# Patient Record
Sex: FEMALE | Race: WHITE | NOT HISPANIC OR LATINO | URBAN - METROPOLITAN AREA
[De-identification: names, ages, dates, MRNs, and addresses within clinical notes are randomized per-mention and may not be internally consistent; named-entity substitution may affect disease eponyms.]

---

## 2021-01-25 ENCOUNTER — OFFICE VISIT (OUTPATIENT)
Dept: URBAN - METROPOLITAN AREA CLINIC 46 | Facility: CLINIC | Age: 71
End: 2021-01-25

## 2021-03-22 ENCOUNTER — OFFICE VISIT (OUTPATIENT)
Dept: URBAN - METROPOLITAN AREA CLINIC 46 | Facility: CLINIC | Age: 71
End: 2021-03-22

## 2021-05-04 ENCOUNTER — OFFICE VISIT (OUTPATIENT)
Dept: URBAN - METROPOLITAN AREA CLINIC 46 | Facility: CLINIC | Age: 71
End: 2021-05-04

## 2021-06-09 ENCOUNTER — OFFICE VISIT (OUTPATIENT)
Dept: URBAN - NONMETROPOLITAN AREA CLINIC 9 | Facility: CLINIC | Age: 71
End: 2021-06-09

## 2021-08-28 ENCOUNTER — TELEPHONE ENCOUNTER (OUTPATIENT)
Dept: URBAN - METROPOLITAN AREA CLINIC 13 | Facility: CLINIC | Age: 71
End: 2021-08-28

## 2021-08-29 ENCOUNTER — TELEPHONE ENCOUNTER (OUTPATIENT)
Dept: URBAN - METROPOLITAN AREA CLINIC 13 | Facility: CLINIC | Age: 71
End: 2021-08-29

## 2021-08-29 RX ORDER — IBUPROFEN 100 MG/1
TABLET, COATED ORAL
Status: ACTIVE | COMMUNITY

## 2021-08-29 RX ORDER — FLUOXETINE HYDROCHLORIDE 20 MG/1
TABLET ORAL
Status: ACTIVE | COMMUNITY

## 2021-08-29 RX ORDER — OMEPRAZOLE 10 MG/1
CAPSULE, DELAYED RELEASE ORAL
Status: ACTIVE | COMMUNITY
Start: 2017-12-08

## 2021-08-29 RX ORDER — DICYCLOMINE HYDROCHLORIDE 20 MG/1
TABLET ORAL
Status: ACTIVE | COMMUNITY
Start: 2021-01-25

## 2021-08-29 RX ORDER — ASPIRIN 81 MG/1
TABLET, COATED ORAL
Status: ACTIVE | COMMUNITY

## 2021-08-29 RX ORDER — HYDROCHLOROTHIAZIDE 50 MG/1
TABLET ORAL
Status: ACTIVE | COMMUNITY

## 2021-08-29 RX ORDER — ATORVASTATIN CALCIUM 10 MG/1
TABLET ORAL
Status: ACTIVE | COMMUNITY

## 2021-08-29 RX ORDER — LATANOPROST/PF 0.005 %
DROPS OPHTHALMIC (EYE)
Status: ACTIVE | COMMUNITY

## 2021-08-29 RX ORDER — METOPROLOL TARTRATE 25 MG/1
TABLET, FILM COATED ORAL
Status: ACTIVE | COMMUNITY

## 2021-08-29 RX ORDER — OMEPRAZOLE 40 MG/1
CAPSULE, DELAYED RELEASE ORAL
Status: ACTIVE | COMMUNITY
Start: 2021-06-09

## 2021-09-29 ENCOUNTER — OFFICE VISIT (OUTPATIENT)
Dept: URBAN - METROPOLITAN AREA CLINIC 46 | Facility: CLINIC | Age: 71
End: 2021-09-29

## 2021-10-06 ENCOUNTER — ERX REFILL RESPONSE (OUTPATIENT)
Dept: URBAN - METROPOLITAN AREA CLINIC 44 | Facility: CLINIC | Age: 71
End: 2021-10-06

## 2021-10-06 RX ORDER — OMEPRAZOLE 40 MG/1
CAPSULE, DELAYED RELEASE ORAL
OUTPATIENT

## 2021-10-06 RX ORDER — OMEPRAZOLE 20 MG/1
TAKE 1 CAPSULE BY MOUTH EVERY DAY CAPSULE, DELAYED RELEASE ORAL
Qty: 90 CAPSULE | Refills: 1 | OUTPATIENT

## 2021-10-22 ENCOUNTER — OFFICE VISIT (OUTPATIENT)
Dept: URBAN - METROPOLITAN AREA CLINIC 44 | Facility: CLINIC | Age: 71
End: 2021-10-22
Payer: COMMERCIAL

## 2021-10-22 VITALS
WEIGHT: 182 LBS | BODY MASS INDEX: 33.49 KG/M2 | DIASTOLIC BLOOD PRESSURE: 64 MMHG | SYSTOLIC BLOOD PRESSURE: 121 MMHG | TEMPERATURE: 98 F | HEART RATE: 73 BPM | HEIGHT: 62 IN

## 2021-10-22 DIAGNOSIS — I89.0: ICD-10-CM

## 2021-10-22 DIAGNOSIS — R19.4: ICD-10-CM

## 2021-10-22 DIAGNOSIS — K57.30 ACQUIRED DIVERTICULOSIS OF COLON: ICD-10-CM

## 2021-10-22 DIAGNOSIS — K22.2 ESOPHAGEAL OBSTRUCTION: ICD-10-CM

## 2021-10-22 PROCEDURE — 99213 OFFICE O/P EST LOW 20 MIN: CPT | Performed by: INTERNAL MEDICINE

## 2021-10-22 RX ORDER — ATORVASTATIN CALCIUM 10 MG/1
TABLET ORAL
Status: ACTIVE | COMMUNITY

## 2021-10-22 RX ORDER — IBUPROFEN 100 MG/1
TABLET, COATED ORAL
Status: ACTIVE | COMMUNITY

## 2021-10-22 RX ORDER — LATANOPROST/PF 0.005 %
DROPS OPHTHALMIC (EYE)
Status: ACTIVE | COMMUNITY

## 2021-10-22 RX ORDER — METOPROLOL TARTRATE 25 MG/1
TABLET, FILM COATED ORAL
Status: ACTIVE | COMMUNITY

## 2021-10-22 RX ORDER — ASPIRIN 81 MG/1
TABLET, COATED ORAL
Status: ACTIVE | COMMUNITY

## 2021-10-22 RX ORDER — FAMOTIDINE 40 MG/1
1 TABLET AT BEDTIME TABLET, FILM COATED ORAL TWICE A DAY
Qty: 60 TABLET | Refills: 10 | OUTPATIENT
Start: 2021-10-22

## 2021-10-22 RX ORDER — DICYCLOMINE HYDROCHLORIDE 20 MG/1
TABLET ORAL
Status: ACTIVE | COMMUNITY
Start: 2021-01-25

## 2021-10-22 RX ORDER — OMEPRAZOLE 20 MG/1
TAKE 1 CAPSULE BY MOUTH EVERY DAY CAPSULE, DELAYED RELEASE ORAL
Qty: 90 CAPSULE | Refills: 1 | Status: ACTIVE | COMMUNITY

## 2021-10-22 RX ORDER — HYDROCHLOROTHIAZIDE 50 MG/1
TABLET ORAL
Status: ACTIVE | COMMUNITY

## 2021-10-22 RX ORDER — FLUOXETINE HYDROCHLORIDE 20 MG/1
TABLET ORAL
Status: ACTIVE | COMMUNITY

## 2021-10-22 NOTE — HPI-GERD
71 year old female with a history of an esophageal stricture and duodenal lymphangiectasia. She had  herlast EGD/Colon 7/9/19 with an esophageal stricture dilated to 18 mm with balloon. Colon with hyperplastic polyps, internal hemorrhoids, and mild diverticulosis. The small bowel biopsies revealed NO signs of celiac disease or infectious agents. 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  She is on Omeprazole. The PA increased it to 40 mg. She went back down to 20 mg on her own. She is not having heartburn, indigestion or trouble swallowing. She would like to come off PPI. Three to 4 times a week she does have a cough with phlegm and regurgitation. She attributes this to COVID vaccine.   She took a 1-month course of Align probiotic and diarrhea has resolved; she is now having a soft to formed stool daily to every other day. She had her second COVID-19 vaccine with Moderna on March 10th. She has had increase in coughing, sometimes resulting in vomiting, nausea, and SOB while lying down. She is now feeling much improved, but has seen a pulmonologist and reports normal PFT's and CXR; states pulmonologist thought symptoms were related to reflux.

## 2022-01-04 ENCOUNTER — ERX REFILL RESPONSE (OUTPATIENT)
Dept: URBAN - METROPOLITAN AREA CLINIC 44 | Facility: CLINIC | Age: 72
End: 2022-01-04

## 2022-01-04 RX ORDER — OMEPRAZOLE 20 MG/1
TAKE 1 CAPSULE BY MOUTH EVERY DAY CAPSULE, DELAYED RELEASE ORAL
Qty: 90 CAPSULE | Refills: 1 | OUTPATIENT

## 2022-01-19 ENCOUNTER — OFFICE VISIT (OUTPATIENT)
Dept: URBAN - METROPOLITAN AREA CLINIC 44 | Facility: CLINIC | Age: 72
End: 2022-01-19
Payer: COMMERCIAL

## 2022-01-19 VITALS
HEIGHT: 62 IN | DIASTOLIC BLOOD PRESSURE: 61 MMHG | SYSTOLIC BLOOD PRESSURE: 111 MMHG | WEIGHT: 179 LBS | TEMPERATURE: 98.2 F | HEART RATE: 72 BPM | BODY MASS INDEX: 32.94 KG/M2

## 2022-01-19 DIAGNOSIS — I89.0 LYMPHANGIECTASIA: ICD-10-CM

## 2022-01-19 DIAGNOSIS — Z86.010 COLON POLYP HISTORY: ICD-10-CM

## 2022-01-19 DIAGNOSIS — K21.9 GERD: ICD-10-CM

## 2022-01-19 DIAGNOSIS — Z86.73: ICD-10-CM

## 2022-01-19 PROBLEM — 3305006: Status: ACTIVE | Noted: 2022-01-19

## 2022-01-19 PROBLEM — 428283002 HISTORY OF POLYP OF COLON: Status: ACTIVE | Noted: 2021-01-25

## 2022-01-19 PROBLEM — 429993008 HISTORY OF CEREBROVASCULAR ACCIDENT WITHOUT RESIDUAL DEFICITS: Status: ACTIVE | Noted: 2019-05-28

## 2022-01-19 PROCEDURE — 99213 OFFICE O/P EST LOW 20 MIN: CPT | Performed by: INTERNAL MEDICINE

## 2022-01-19 RX ORDER — DICYCLOMINE HYDROCHLORIDE 20 MG/1
TABLET ORAL
Status: ACTIVE | COMMUNITY
Start: 2021-01-25

## 2022-01-19 RX ORDER — METOPROLOL TARTRATE 25 MG/1
TABLET, FILM COATED ORAL
Status: ACTIVE | COMMUNITY

## 2022-01-19 RX ORDER — OMEPRAZOLE 20 MG/1
TAKE 1 CAPSULE BY MOUTH EVERY DAY CAPSULE, DELAYED RELEASE ORAL
Qty: 90 CAPSULE | Refills: 1 | Status: ACTIVE | COMMUNITY

## 2022-01-19 RX ORDER — HYDROCHLOROTHIAZIDE 50 MG/1
TABLET ORAL
Status: ACTIVE | COMMUNITY

## 2022-01-19 RX ORDER — FAMOTIDINE 40 MG/1
1 TABLET AT BEDTIME TABLET, FILM COATED ORAL TWICE A DAY
Qty: 60 TABLET | Refills: 10 | Status: ACTIVE | COMMUNITY
Start: 2021-10-22

## 2022-01-19 RX ORDER — LATANOPROST/PF 0.005 %
DROPS OPHTHALMIC (EYE)
Status: ACTIVE | COMMUNITY

## 2022-01-19 RX ORDER — FLUOXETINE HYDROCHLORIDE 20 MG/1
TABLET ORAL
Status: ACTIVE | COMMUNITY

## 2022-01-19 RX ORDER — ATORVASTATIN CALCIUM 10 MG/1
TABLET ORAL
Status: ACTIVE | COMMUNITY

## 2022-01-19 RX ORDER — IBUPROFEN 100 MG/1
TABLET, COATED ORAL
Status: ACTIVE | COMMUNITY

## 2022-01-19 RX ORDER — ASPIRIN 81 MG/1
TABLET, COATED ORAL
Status: ACTIVE | COMMUNITY

## 2022-01-19 NOTE — HPI-GERD
71 year old female with a history of an esophageal stricture and duodenal lymphangiectasia. She had  her last EGD/Colon 7/9/19 with an esophageal stricture dilated to 18 mm with balloon. Colon with hyperplastic polyps, internal hemorrhoids, and mild diverticulosis. The small bowel biopsies revealed NO signs of celiac disease or infectious agents. 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  She is on Omeprazole. The PA increased it to 40 mg. She went back down to 20 mg on her own. After her last OV, she tried to come off PPI and tried Pepcid. The reflux was too severe. Since she went back on PPI these symptoms have resolved. She is now on Omeprazole 20 mg. Has not had to go back up to 40 mg.   Three to 4 times a week she does have a cough with phlegm and regurgitation. She attributes this to COVID-19 vaccine. She has not had Booster, not sure she wants it. Last vaccine was March 10th  She took a 1-month course of Align probiotic and diarrhea has resolved; she is now having a soft to formed stool daily. Dietary referral was sent at last visit. She never got a call from them so did not have the consult. Her diarrhea is better. She went online to review lymphangiectasia diet. As long as she does what she learned online she feels better. Not sure she wants to see a dietician now.   EGD advised as needed and colon advised July 2029. Labs 11/3/2020 were OK I do not have recent labs, but they were just done in [place].

## 2022-02-21 ENCOUNTER — TELEPHONE ENCOUNTER (OUTPATIENT)
Dept: URBAN - METROPOLITAN AREA CLINIC 46 | Facility: CLINIC | Age: 72
End: 2022-02-21

## 2022-02-21 RX ORDER — OMEPRAZOLE 20 MG/1
1 CAPSULE 30 MINUTES BEFORE MORNING MEAL CAPSULE, DELAYED RELEASE ORAL ONCE A DAY
Qty: 90 | Refills: 1

## 2022-11-15 ENCOUNTER — OFFICE VISIT (OUTPATIENT)
Dept: URBAN - METROPOLITAN AREA CLINIC 44 | Facility: CLINIC | Age: 72
End: 2022-11-15
Payer: COMMERCIAL

## 2022-11-15 VITALS
BODY MASS INDEX: 34.89 KG/M2 | HEIGHT: 62 IN | HEART RATE: 86 BPM | TEMPERATURE: 98.1 F | OXYGEN SATURATION: 95 % | DIASTOLIC BLOOD PRESSURE: 79 MMHG | SYSTOLIC BLOOD PRESSURE: 155 MMHG | WEIGHT: 189.6 LBS

## 2022-11-15 DIAGNOSIS — K57.30 ACQUIRED DIVERTICULOSIS OF COLON: ICD-10-CM

## 2022-11-15 DIAGNOSIS — K57.90 DIVERTICULOSIS: ICD-10-CM

## 2022-11-15 DIAGNOSIS — K22.2 ESOPHAGEAL OBSTRUCTION: ICD-10-CM

## 2022-11-15 DIAGNOSIS — R19.7 DIARRHEA, UNSPECIFIED TYPE: ICD-10-CM

## 2022-11-15 PROBLEM — 142251000119102: Status: ACTIVE | Noted: 2022-11-15

## 2022-11-15 PROCEDURE — 99214 OFFICE O/P EST MOD 30 MIN: CPT | Performed by: INTERNAL MEDICINE

## 2022-11-15 RX ORDER — LATANOPROST/PF 0.005 %
DROPS OPHTHALMIC (EYE)
Status: ACTIVE | COMMUNITY

## 2022-11-15 RX ORDER — ALPRAZOLAM 0.25 MG/1
AS DIRECTED TABLET ORAL AS NEEDED
Status: ACTIVE | COMMUNITY

## 2022-11-15 RX ORDER — ATORVASTATIN CALCIUM 10 MG/1
TABLET ORAL
Status: ACTIVE | COMMUNITY

## 2022-11-15 RX ORDER — OMEPRAZOLE 40 MG/1
1 CAPSULE 30 MINUTES BEFORE MORNING MEAL CAPSULE, DELAYED RELEASE ORAL ONCE A DAY
Qty: 90 | Refills: 1 | Status: ACTIVE | COMMUNITY

## 2022-11-15 RX ORDER — RIFAXIMIN 550 MG/1
1 TABLET TABLET ORAL THREE TIMES A DAY
Qty: 42 TABLET | Refills: 2 | OUTPATIENT
Start: 2022-11-15 | End: 2022-12-27

## 2022-11-15 RX ORDER — ASCORBIC ACID 1000 MG
1 CAPSULE TABLET ORAL TWICE A DAY
Status: ACTIVE | COMMUNITY

## 2022-11-15 RX ORDER — ASPIRIN 81 MG/1
TABLET, COATED ORAL
Status: ACTIVE | COMMUNITY

## 2022-11-15 RX ORDER — MELOXICAM 7.5 MG/1
TABLET ORAL
Qty: 30 TABLET | Status: ACTIVE | COMMUNITY

## 2022-11-15 RX ORDER — DULOXETINE 30 MG/1
TAKE 1 CAPSULE BY MOUTH EVERY DAY CAPSULE, DELAYED RELEASE ORAL
Qty: 30 EACH | Refills: 1 | Status: ACTIVE | COMMUNITY

## 2022-11-15 RX ORDER — METOPROLOL TARTRATE 25 MG/1
TABLET, FILM COATED ORAL
Status: ACTIVE | COMMUNITY

## 2022-11-15 NOTE — HPI-GERD
72 year old female with a history of an esophageal stricture and duodenal lymphangiectasia. She had  her last EGD/Colon 7/9/19 with an esophageal stricture dilated to 18 mm with balloon. Colon with hyperplastic polyps, internal hemorrhoids, and mild diverticulosis. The small bowel biopsies revealed NO signs of celiac disease or infectious agents. 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  She is now having intermittent episodes of diarrhea with incontinence. SHe can have 2 to 3 watery stools and has had accidents in several places as she can not get to the rest room in time. She is on Omeprazole back down to 20 mg on her own with trouble and went up to 40 mg. After her last OV, she tried to come off PPI and tried Pepcid. The reflux was too severe. Since she went back on PPI these symptoms have resolved.  Three to 4 times a week she does have a cough with phlegm and regurgitation. She attributes this to COVID-19 vaccine. She has not had Booster, not sure she wants it. Last vaccine was March 10th  She took a 1-month course of Align probiotic which helped diarrhea in the past. Dietary referral was sent and she saw them once. She went online to review lymphangiectasia diet as well.   EGD advised as needed and colon advised July 2029. Labs 11/3/2020 were OK I do not have recent labs, but they were just done in [place].

## 2022-11-16 ENCOUNTER — TELEPHONE ENCOUNTER (OUTPATIENT)
Dept: URBAN - METROPOLITAN AREA CLINIC 44 | Facility: CLINIC | Age: 72
End: 2022-11-16

## 2022-11-16 RX ORDER — MELOXICAM 7.5 MG/1
TABLET ORAL
Qty: 30 TABLET | Status: ACTIVE | COMMUNITY

## 2022-11-16 RX ORDER — RIFAXIMIN 550 MG/1
1 TABLET TABLET ORAL THREE TIMES A DAY
Qty: 42 TABLET | Refills: 2 | Status: ACTIVE | COMMUNITY
Start: 2022-11-15 | End: 2022-12-27

## 2022-11-16 RX ORDER — ASPIRIN 81 MG/1
TABLET, COATED ORAL
Status: ACTIVE | COMMUNITY

## 2022-11-16 RX ORDER — ALPRAZOLAM 0.25 MG/1
AS DIRECTED TABLET ORAL AS NEEDED
Status: ACTIVE | COMMUNITY

## 2022-11-16 RX ORDER — METRONIDAZOLE 250 MG/1
1 TABLET TABLET ORAL THREE TIMES A DAY
Qty: 30 | Refills: 0 | OUTPATIENT
Start: 2022-11-18 | End: 2022-11-28

## 2022-11-16 RX ORDER — OMEPRAZOLE 40 MG/1
1 CAPSULE 30 MINUTES BEFORE MORNING MEAL CAPSULE, DELAYED RELEASE ORAL ONCE A DAY
Qty: 90 | Refills: 1 | Status: ACTIVE | COMMUNITY

## 2022-11-16 RX ORDER — DULOXETINE 30 MG/1
TAKE 1 CAPSULE BY MOUTH EVERY DAY CAPSULE, DELAYED RELEASE ORAL
Qty: 30 EACH | Refills: 1 | Status: ACTIVE | COMMUNITY

## 2022-11-16 RX ORDER — LATANOPROST/PF 0.005 %
DROPS OPHTHALMIC (EYE)
Status: ACTIVE | COMMUNITY

## 2022-11-16 RX ORDER — ATORVASTATIN CALCIUM 10 MG/1
TABLET ORAL
Status: ACTIVE | COMMUNITY

## 2022-11-16 RX ORDER — ASCORBIC ACID 1000 MG
1 CAPSULE TABLET ORAL TWICE A DAY
Status: ACTIVE | COMMUNITY

## 2022-11-16 RX ORDER — METOPROLOL TARTRATE 25 MG/1
TABLET, FILM COATED ORAL
Status: ACTIVE | COMMUNITY

## 2022-12-01 ENCOUNTER — ERX REFILL RESPONSE (OUTPATIENT)
Dept: URBAN - METROPOLITAN AREA CLINIC 46 | Facility: CLINIC | Age: 72
End: 2022-12-01

## 2022-12-01 RX ORDER — OMEPRAZOLE 40 MG/1
1 CAPSULE 30 MINUTES BEFORE MORNING MEAL CAPSULE, DELAYED RELEASE ORAL ONCE A DAY
Qty: 90 | Refills: 1 | OUTPATIENT

## 2023-01-03 ENCOUNTER — OFFICE VISIT (OUTPATIENT)
Dept: URBAN - METROPOLITAN AREA CLINIC 44 | Facility: CLINIC | Age: 73
End: 2023-01-03

## 2023-02-17 ENCOUNTER — TELEPHONE ENCOUNTER (OUTPATIENT)
Dept: URBAN - METROPOLITAN AREA CLINIC 44 | Facility: CLINIC | Age: 73
End: 2023-02-17

## 2023-02-17 RX ORDER — OMEPRAZOLE 20 MG/1
1 CAPSULE 30 MINUTES BEFORE MORNING MEAL CAPSULE, DELAYED RELEASE ORAL ONCE A DAY
Qty: 30 | Refills: 0

## 2023-02-24 ENCOUNTER — CLAIMS CREATED FROM THE CLAIM WINDOW (OUTPATIENT)
Dept: URBAN - METROPOLITAN AREA CLINIC 46 | Facility: CLINIC | Age: 73
End: 2023-02-24
Payer: COMMERCIAL

## 2023-02-24 ENCOUNTER — TELEPHONE ENCOUNTER (OUTPATIENT)
Dept: URBAN - METROPOLITAN AREA CLINIC 46 | Facility: CLINIC | Age: 73
End: 2023-02-24

## 2023-02-24 VITALS
BODY MASS INDEX: 33.79 KG/M2 | SYSTOLIC BLOOD PRESSURE: 124 MMHG | HEIGHT: 62 IN | HEART RATE: 87 BPM | DIASTOLIC BLOOD PRESSURE: 70 MMHG | WEIGHT: 183.6 LBS | TEMPERATURE: 97.9 F

## 2023-02-24 DIAGNOSIS — K21.9 GERD: ICD-10-CM

## 2023-02-24 DIAGNOSIS — R19.4: ICD-10-CM

## 2023-02-24 DIAGNOSIS — R15.2 FECAL URGENCY: ICD-10-CM

## 2023-02-24 PROBLEM — 235595009 GASTROESOPHAGEAL REFLUX DISEASE: Status: ACTIVE | Noted: 2019-07-09

## 2023-02-24 PROCEDURE — 99204 OFFICE O/P NEW MOD 45 MIN: CPT | Performed by: INTERNAL MEDICINE

## 2023-02-24 PROCEDURE — 99214 OFFICE O/P EST MOD 30 MIN: CPT | Performed by: INTERNAL MEDICINE

## 2023-02-24 RX ORDER — ASPIRIN 81 MG/1
TABLET, COATED ORAL
Status: ACTIVE | COMMUNITY

## 2023-02-24 RX ORDER — HYDROCORTISONE 25 MG/G
1 APPLICATION CREAM TOPICAL ONCE A DAY
Qty: 1 | Refills: 1 | OUTPATIENT

## 2023-02-24 RX ORDER — ATORVASTATIN CALCIUM 10 MG/1
1 TABLET TABLET ORAL ONCE A DAY
Status: ACTIVE | COMMUNITY

## 2023-02-24 RX ORDER — ASCORBIC ACID 1000 MG
1 CAPSULE TABLET ORAL TWICE A DAY
Status: ACTIVE | COMMUNITY

## 2023-02-24 RX ORDER — LATANOPROST/PF 0.005 %
1 DROP INTO AFFECTED EYE IN THE EVENING DROPS OPHTHALMIC (EYE) ONCE A DAY
Status: ACTIVE | COMMUNITY

## 2023-02-24 RX ORDER — HYDROCORTISONE 25 MG/G
1 APPLICATION CREAM TOPICAL TWICE A DAY
Qty: 14 | OUTPATIENT
Start: 2023-02-24 | End: 2023-03-03

## 2023-02-24 RX ORDER — HYDROCORTISONE 25 MG/G
1 APPLICATION CREAM TOPICAL TWICE A DAY
Qty: 14 | Status: ACTIVE | COMMUNITY
Start: 2023-02-24 | End: 2023-03-03

## 2023-02-24 RX ORDER — METOPROLOL TARTRATE 25 MG/1
1 TABLET WITH FOOD TABLET, FILM COATED ORAL TWICE A DAY
Status: ACTIVE | COMMUNITY

## 2023-02-24 RX ORDER — CHOLESTYRAMINE 4 G/5.5G
1 SCOOP POWDER, FOR SUSPENSION ORAL ONCE A DAY
Qty: 30 | OUTPATIENT
Start: 2023-02-24

## 2023-02-24 RX ORDER — OMEPRAZOLE 20 MG/1
1 CAPSULE 30 MINUTES BEFORE MORNING MEAL CAPSULE, DELAYED RELEASE ORAL ONCE A DAY
Qty: 30 | Refills: 0 | Status: ACTIVE | COMMUNITY

## 2023-02-24 RX ORDER — CHOLESTYRAMINE 4 G/5.5G
1 SCOOP POWDER, FOR SUSPENSION ORAL ONCE A DAY
Qty: 30 | Status: ACTIVE | COMMUNITY
Start: 2023-02-24

## 2023-02-24 RX ORDER — ALPRAZOLAM 0.25 MG/1
1 TABLET AS NEEDED TABLET ORAL ONCE A DAY
Qty: 30 TABLET | Status: ACTIVE | COMMUNITY

## 2023-02-24 RX ORDER — DULOXETINE 30 MG/1
TAKE 1 CAPSULE BY MOUTH EVERY DAY CAPSULE, DELAYED RELEASE ORAL ONCE A DAY
Refills: 1 | Status: ACTIVE | COMMUNITY

## 2023-02-24 NOTE — HPI-GERD
72 year old female presents for  second opinion regarding refractor GERD and changes in bowel habits. The patient has a history of an esophageal stricture and duodenal lymphangiectasia.   She has been followed by GI and had a work up which included an EGD/Colon on 7/9/19 with an esophageal stricture dilated to 18 mm with balloon. Colon with hyperplastic polyps, internal hemorrhoids, and mild diverticulosis. There was no suggestion of microscopic colitis. The small bowel biopsies revealed NO signs of celiac disease or infectious agents.  The patient mostly complains of watery diarrhea with episodes of fecal incontinence with urgency. On occasion, she can not make it to the restroom and has soiled her undergarments on a few occasions. Associates a external hemorrhoids with itching and tenderness.   I reviewed her medication list which includes a PPI, statin and anti-psychotic(SNRI). Interestingly, she is s/p cholecystectomy and mentions that many of her urgent BM's have taken place since this procedure. She states that she has never been on a bile acid sequestrant.   She has been on Omeprazole 20-40 mg and has tried Pepcid as well for GERD relief. She mentions that if she does not take a PPI she will have severe heartburn.  I reviewed her last labs from 10/22 which included a CBC, CMP, lipid panel and UA. All were normal except for a mildly elevated Triglyceride level to 150s.

## 2023-02-24 NOTE — PHYSICAL EXAM CARDIOVASCULAR:
Addended by: ZAMZAM VILLALOBOS on: 4/30/2021 06:27 PM     Modules accepted: Orders     no edema, no murmurs, regular rate and rhythm

## 2023-02-28 ENCOUNTER — ERX REFILL RESPONSE (OUTPATIENT)
Dept: URBAN - METROPOLITAN AREA CLINIC 46 | Facility: CLINIC | Age: 73
End: 2023-02-28

## 2023-02-28 RX ORDER — CHOLESTYRAMINE 4 G/5.5G
1 SCOOP POWDER, FOR SUSPENSION ORAL ONCE A DAY
Qty: 30 | OUTPATIENT

## 2023-02-28 RX ORDER — CHOLESTYRAMINE LIGHT 4 G/5.7G
1 SCOOP ORALLY ONCE A DAY FOR 30 DAYS POWDER, FOR SUSPENSION ORAL
Qty: 90 EACH | Refills: 0 | OUTPATIENT

## 2023-04-13 ENCOUNTER — TELEPHONE ENCOUNTER (OUTPATIENT)
Dept: URBAN - METROPOLITAN AREA CLINIC 46 | Facility: CLINIC | Age: 73
End: 2023-04-13

## 2023-04-13 RX ORDER — CIPROFLOXACIN HYDROCHLORIDE 500 MG/1
1 TABLET TABLET, FILM COATED ORAL
Qty: 6 | OUTPATIENT
Start: 2023-04-13 | End: 2023-04-16

## 2023-05-22 ENCOUNTER — CLAIMS CREATED FROM THE CLAIM WINDOW (OUTPATIENT)
Dept: URBAN - METROPOLITAN AREA CLINIC 48 | Facility: CLINIC | Age: 73
End: 2023-05-22
Payer: COMMERCIAL

## 2023-05-22 VITALS
SYSTOLIC BLOOD PRESSURE: 127 MMHG | WEIGHT: 184.8 LBS | HEART RATE: 79 BPM | HEIGHT: 62 IN | DIASTOLIC BLOOD PRESSURE: 76 MMHG | TEMPERATURE: 97.9 F | OXYGEN SATURATION: 94 % | BODY MASS INDEX: 34.01 KG/M2

## 2023-05-22 DIAGNOSIS — K21.00 GASTROESOPHAGEAL REFLUX DISEASE WITH ESOPHAGITIS WITHOUT HEMORRHAGE: ICD-10-CM

## 2023-05-22 DIAGNOSIS — R19.4: ICD-10-CM

## 2023-05-22 PROBLEM — 266433003: Status: ACTIVE | Noted: 2023-05-22

## 2023-05-22 PROCEDURE — 99213 OFFICE O/P EST LOW 20 MIN: CPT | Performed by: INTERNAL MEDICINE

## 2023-05-22 RX ORDER — ASPIRIN 81 MG/1
TABLET, COATED ORAL
Status: ACTIVE | COMMUNITY

## 2023-05-22 RX ORDER — OMEPRAZOLE 20 MG/1
1 CAPSULE 30 MINUTES BEFORE MORNING MEAL CAPSULE, DELAYED RELEASE ORAL ONCE A DAY
Qty: 30 | Refills: 0 | Status: ACTIVE | COMMUNITY

## 2023-05-22 RX ORDER — DULOXETINE 30 MG/1
TAKE 1 CAPSULE BY MOUTH EVERY DAY CAPSULE, DELAYED RELEASE ORAL ONCE A DAY
Refills: 1 | Status: ACTIVE | COMMUNITY

## 2023-05-22 RX ORDER — ASCORBIC ACID 1000 MG
1 CAPSULE TABLET ORAL TWICE A DAY
Status: ACTIVE | COMMUNITY

## 2023-05-22 RX ORDER — LATANOPROST/PF 0.005 %
1 DROP INTO AFFECTED EYE IN THE EVENING DROPS OPHTHALMIC (EYE) ONCE A DAY
Status: ACTIVE | COMMUNITY

## 2023-05-22 RX ORDER — ALPRAZOLAM 0.25 MG/1
1 TABLET TABLET ORAL AS NEEDED
Status: ACTIVE | COMMUNITY

## 2023-05-22 RX ORDER — OMEPRAZOLE 20 MG/1
1 CAPSULE 30 MINUTES BEFORE MORNING MEAL CAPSULE, DELAYED RELEASE ORAL ONCE A DAY
Qty: 30 | Refills: 0

## 2023-05-22 RX ORDER — FLUTICASONE PROPIONATE 50 UG/1
1 SPRAY IN EACH NOSTRIL SPRAY, METERED NASAL ONCE A DAY
Status: ACTIVE | COMMUNITY

## 2023-05-22 RX ORDER — AZELASTINE HYDROCHLORIDE 137 UG/1
1 PUFF IN EACH NOSTRIL SPRAY, METERED NASAL TWICE A DAY
Status: ACTIVE | COMMUNITY

## 2023-05-22 RX ORDER — ATORVASTATIN CALCIUM 10 MG/1
1 TABLET TABLET ORAL ONCE A DAY
Status: ACTIVE | COMMUNITY

## 2023-05-22 RX ORDER — METOPROLOL TARTRATE 25 MG/1
1 TABLET WITH FOOD TABLET, FILM COATED ORAL TWICE A DAY
Status: ACTIVE | COMMUNITY

## 2023-05-22 NOTE — HPI-GERD
72 year old female presents for a follow up of  changes in bowel habits which has been going on for several years.  Colonoscopy on 7/19 revealed  hyperplastic polyps, internal hemorrhoids, and mild diverticulosis. There was no suggestion of microscopic colitis. The small bowel biopsies revealed NO signs of celiac disease or infectious agents.  The patient mostly complains of watery diarrhea with episodes of fecal incontinence with urgency. On occasion, she can not make it to the restroom and has soiled her undergarments occasionally. Associates an external hemorrhoid with itching and tenderness.   I reviewed her medication list which includes a PPI, statin and anti-psychotic(SNRI). Interestingly, she is s/p cholecystectomy and mentions that many of her urgent BM's have taken place since this procedure. She states that she has never been on a bile acid sequestrant.   She has been on Omeprazole 20-40 mg and has tried Pepcid as well for GERD relief. She mentions that if she does not take a PPI she will have severe heartburn.  I reviewed her last labs from 10/22 which included a CBC, CMP, lipid panel and UA. All were normal except for a mildly elevated Triglyceride level to 150s.  The patient called us about 2 months or so ago with a complaint of LLQ pain. We prescribed Cipro 500 mg regimen that alleviated her symptoms. Today, she is symptom free and has no issues.

## 2023-08-24 ENCOUNTER — TELEPHONE ENCOUNTER (OUTPATIENT)
Dept: URBAN - METROPOLITAN AREA CLINIC 48 | Facility: CLINIC | Age: 73
End: 2023-08-24

## 2023-08-24 RX ORDER — OMEPRAZOLE 20 MG/1
1 CAPSULE 30 MINUTES BEFORE MORNING MEAL CAPSULE, DELAYED RELEASE ORAL ONCE A DAY
Qty: 30 | Refills: 3

## 2023-12-05 ENCOUNTER — TELEPHONE ENCOUNTER (OUTPATIENT)
Dept: URBAN - METROPOLITAN AREA CLINIC 48 | Facility: CLINIC | Age: 73
End: 2023-12-05

## 2023-12-05 RX ORDER — OMEPRAZOLE 20 MG/1
1 CAPSULE 30 MINUTES BEFORE MORNING MEAL CAPSULE, DELAYED RELEASE ORAL ONCE A DAY
Qty: 30 | Refills: 3

## 2023-12-10 ENCOUNTER — P2P PATIENT RECORD (OUTPATIENT)
Age: 73
End: 2023-12-10

## 2024-01-30 ENCOUNTER — TELEPHONE ENCOUNTER (OUTPATIENT)
Dept: URBAN - METROPOLITAN AREA CLINIC 46 | Facility: CLINIC | Age: 74
End: 2024-01-30

## 2024-02-13 ENCOUNTER — LAB (OUTPATIENT)
Dept: URBAN - METROPOLITAN AREA CLINIC 48 | Facility: CLINIC | Age: 74
End: 2024-02-13

## 2024-02-13 ENCOUNTER — OV EP (OUTPATIENT)
Dept: URBAN - METROPOLITAN AREA CLINIC 48 | Facility: CLINIC | Age: 74
End: 2024-02-13
Payer: COMMERCIAL

## 2024-02-13 VITALS
HEIGHT: 62 IN | OXYGEN SATURATION: 95 % | HEART RATE: 86 BPM | DIASTOLIC BLOOD PRESSURE: 85 MMHG | SYSTOLIC BLOOD PRESSURE: 134 MMHG | BODY MASS INDEX: 34.45 KG/M2 | WEIGHT: 187.2 LBS | TEMPERATURE: 97.7 F

## 2024-02-13 DIAGNOSIS — K57.90 DIVERTICULOSIS: ICD-10-CM

## 2024-02-13 DIAGNOSIS — R10.30 LOWER ABDOMINAL PAIN: ICD-10-CM

## 2024-02-13 PROBLEM — 397881000: Status: ACTIVE | Noted: 2024-02-13

## 2024-02-13 PROCEDURE — 99214 OFFICE O/P EST MOD 30 MIN: CPT | Performed by: NURSE PRACTITIONER

## 2024-02-13 RX ORDER — OMEPRAZOLE 20 MG/1
1 CAPSULE 30 MINUTES BEFORE MORNING MEAL CAPSULE, DELAYED RELEASE ORAL ONCE A DAY
Qty: 30 | Refills: 3 | Status: ACTIVE | COMMUNITY

## 2024-02-13 RX ORDER — AMOXICILLIN AND CLAVULANATE POTASSIUM 500; 125 MG/1; 1/1
1 TABLET TABLET, FILM COATED ORAL
Qty: 14 | OUTPATIENT
Start: 2024-02-13 | End: 2024-02-20

## 2024-02-13 RX ORDER — PROMETHAZINE HYDROCHLORIDE 12.5 MG/1
1 TABLET AS NEEDED TABLET ORAL
Status: ACTIVE | COMMUNITY

## 2024-02-13 RX ORDER — DULOXETINE 30 MG/1
TAKE 1 CAPSULE BY MOUTH EVERY DAY CAPSULE, DELAYED RELEASE ORAL ONCE A DAY
Refills: 1 | Status: ACTIVE | COMMUNITY

## 2024-02-13 RX ORDER — TIZANIDINE 2 MG/1
1 TABLET AS NEEDED TABLET ORAL THREE TIMES A DAY
Status: ACTIVE | COMMUNITY

## 2024-02-13 RX ORDER — ASCORBIC ACID 1000 MG
1 CAPSULE TABLET ORAL TWICE A DAY
Status: ACTIVE | COMMUNITY

## 2024-02-13 RX ORDER — AZELASTINE HYDROCHLORIDE 137 UG/1
1 PUFF IN EACH NOSTRIL SPRAY, METERED NASAL TWICE A DAY
Status: ACTIVE | COMMUNITY

## 2024-02-13 RX ORDER — ALPRAZOLAM 0.25 MG/1
1 TABLET TABLET ORAL AS NEEDED
Status: ACTIVE | COMMUNITY

## 2024-02-13 RX ORDER — METOPROLOL TARTRATE 25 MG/1
1 TABLET WITH FOOD TABLET, FILM COATED ORAL TWICE A DAY
Status: ACTIVE | COMMUNITY

## 2024-02-13 RX ORDER — HYOSCYAMINE SULFATE 0.12 MG/1
1 TABLET AS NEEDED TABLET ORAL
Status: ACTIVE | COMMUNITY

## 2024-02-13 RX ORDER — ASPIRIN 81 MG/1
TABLET, COATED ORAL
Status: ACTIVE | COMMUNITY

## 2024-02-13 RX ORDER — LATANOPROST/PF 0.005 %
1 DROP INTO AFFECTED EYE IN THE EVENING DROPS OPHTHALMIC (EYE) ONCE A DAY
Status: ACTIVE | COMMUNITY

## 2024-02-13 RX ORDER — ATORVASTATIN CALCIUM 10 MG/1
1 TABLET TABLET ORAL ONCE A DAY
Status: ACTIVE | COMMUNITY

## 2024-02-13 RX ORDER — FLUTICASONE PROPIONATE 50 UG/1
1 SPRAY IN EACH NOSTRIL SPRAY, METERED NASAL ONCE A DAY
Status: ACTIVE | COMMUNITY

## 2024-02-13 NOTE — PHYSICAL EXAM GASTROINTESTINAL
Abdomen , soft, mild lower abdominal tenderness, nondistended , no guarding or rigidity , no masses palpable , normal bowel sounds , Liver and Spleen , no hepatomegaly present , no hepatosplenomegaly , liver nontender , spleen not palpable

## 2024-02-13 NOTE — HPI-TODAY'S VISIT:
73 year old female presents for evaluation of lower abdominal pain. The patient had pain that began 1/21/24 at which time she called our office and Cipro and Flagyl were prescribed. At the time, she was having loose stools but were non-bloody. She continued to have lower abdominal pain. ER visit 1/29/24 and CT with IV contrast showed diverticulosis and some fluid in the colon. Bowel movements are back to baseline. The patient continues to have lower abdominal pain but it has mildly improved. Having a bowel movement does not relieve pain. She also mentions a "catch" type pain in the RUQ that she feels when she bends over. Last colon was in 2019 and showed mild diverticulosis and a hyperplastic polyp.  The patient has a history of dysphagia and esophageal stricture but denies dysphagia at this time. Last EGD was in 2019 and showed a esophageal strictue s/p dilatation

## 2024-02-26 ENCOUNTER — COLON (OUTPATIENT)
Dept: URBAN - METROPOLITAN AREA SURGERY CENTER 28 | Facility: SURGERY CENTER | Age: 74
End: 2024-02-26

## 2024-02-26 RX ORDER — ALPRAZOLAM 0.25 MG/1
1 TABLET TABLET ORAL AS NEEDED
Status: ACTIVE | COMMUNITY

## 2024-02-26 RX ORDER — FLUTICASONE PROPIONATE 50 UG/1
1 SPRAY IN EACH NOSTRIL SPRAY, METERED NASAL ONCE A DAY
Status: ACTIVE | COMMUNITY

## 2024-02-26 RX ORDER — DULOXETINE 30 MG/1
TAKE 1 CAPSULE BY MOUTH EVERY DAY CAPSULE, DELAYED RELEASE ORAL ONCE A DAY
Refills: 1 | Status: ACTIVE | COMMUNITY

## 2024-02-26 RX ORDER — OMEPRAZOLE 20 MG/1
1 CAPSULE 30 MINUTES BEFORE MORNING MEAL CAPSULE, DELAYED RELEASE ORAL ONCE A DAY
Qty: 30 | Refills: 3 | Status: ACTIVE | COMMUNITY

## 2024-02-26 RX ORDER — METOPROLOL TARTRATE 25 MG/1
1 TABLET WITH FOOD TABLET, FILM COATED ORAL TWICE A DAY
Status: ACTIVE | COMMUNITY

## 2024-02-26 RX ORDER — TIZANIDINE 2 MG/1
1 TABLET AS NEEDED TABLET ORAL THREE TIMES A DAY
Status: ACTIVE | COMMUNITY

## 2024-02-26 RX ORDER — LATANOPROST/PF 0.005 %
1 DROP INTO AFFECTED EYE IN THE EVENING DROPS OPHTHALMIC (EYE) ONCE A DAY
Status: ACTIVE | COMMUNITY

## 2024-02-26 RX ORDER — ASPIRIN 81 MG/1
TABLET, COATED ORAL
Status: ACTIVE | COMMUNITY

## 2024-02-26 RX ORDER — HYOSCYAMINE SULFATE 0.12 MG/1
1 TABLET AS NEEDED TABLET ORAL
Status: ACTIVE | COMMUNITY

## 2024-02-26 RX ORDER — PROMETHAZINE HYDROCHLORIDE 12.5 MG/1
1 TABLET AS NEEDED TABLET ORAL
Status: ACTIVE | COMMUNITY

## 2024-02-26 RX ORDER — ASCORBIC ACID 1000 MG
1 CAPSULE TABLET ORAL TWICE A DAY
Status: ACTIVE | COMMUNITY

## 2024-02-26 RX ORDER — ATORVASTATIN CALCIUM 10 MG/1
1 TABLET TABLET ORAL ONCE A DAY
Status: ACTIVE | COMMUNITY

## 2024-02-26 RX ORDER — AZELASTINE HYDROCHLORIDE 137 UG/1
1 PUFF IN EACH NOSTRIL SPRAY, METERED NASAL TWICE A DAY
Status: ACTIVE | COMMUNITY

## 2024-04-11 ENCOUNTER — OV EP (OUTPATIENT)
Dept: URBAN - METROPOLITAN AREA CLINIC 48 | Facility: CLINIC | Age: 74
End: 2024-04-11
Payer: COMMERCIAL

## 2024-04-11 VITALS
TEMPERATURE: 96.7 F | BODY MASS INDEX: 33.09 KG/M2 | HEART RATE: 79 BPM | SYSTOLIC BLOOD PRESSURE: 121 MMHG | WEIGHT: 179.8 LBS | HEIGHT: 62 IN | DIASTOLIC BLOOD PRESSURE: 82 MMHG

## 2024-04-11 DIAGNOSIS — K57.90 DIVERTICULOSIS: ICD-10-CM

## 2024-04-11 DIAGNOSIS — K21.9 GERD: ICD-10-CM

## 2024-04-11 DIAGNOSIS — R10.30 LOWER ABDOMINAL PAIN: ICD-10-CM

## 2024-04-11 DIAGNOSIS — R19.4: ICD-10-CM

## 2024-04-11 PROBLEM — 54586004: Status: ACTIVE | Noted: 2024-04-11

## 2024-04-11 PROCEDURE — 99213 OFFICE O/P EST LOW 20 MIN: CPT | Performed by: NURSE PRACTITIONER

## 2024-04-11 RX ORDER — DULOXETINE 30 MG/1
TAKE 1 CAPSULE BY MOUTH EVERY DAY CAPSULE, DELAYED RELEASE ORAL ONCE A DAY
Refills: 1 | Status: ACTIVE | COMMUNITY

## 2024-04-11 RX ORDER — AZELASTINE HYDROCHLORIDE 137 UG/1
1 PUFF IN EACH NOSTRIL SPRAY, METERED NASAL TWICE A DAY
Status: ACTIVE | COMMUNITY

## 2024-04-11 RX ORDER — HYOSCYAMINE SULFATE 0.12 MG/1
1 TABLET UNDER THE TONGUE AND ALLOW TO DISSOLVE AS NEEDED FOR ABDOMINAL PAIN TABLET SUBLINGUAL THREE TIMES A DAY
Qty: 90 TABLET | Refills: 1 | OUTPATIENT
Start: 2024-04-11 | End: 2024-06-10

## 2024-04-11 RX ORDER — TIZANIDINE 2 MG/1
1 TABLET AS NEEDED TABLET ORAL THREE TIMES A DAY
Status: DISCONTINUED | COMMUNITY

## 2024-04-11 RX ORDER — ALPRAZOLAM 0.25 MG/1
1 TABLET TABLET ORAL AS NEEDED
Status: ACTIVE | COMMUNITY

## 2024-04-11 RX ORDER — METOPROLOL TARTRATE 25 MG/1
1 TABLET WITH FOOD TABLET, FILM COATED ORAL TWICE A DAY
Status: ACTIVE | COMMUNITY

## 2024-04-11 RX ORDER — ASCORBIC ACID 1000 MG
1 CAPSULE TABLET ORAL TWICE A DAY
Status: ACTIVE | COMMUNITY

## 2024-04-11 RX ORDER — ASPIRIN 81 MG/1
TABLET, COATED ORAL
Status: ACTIVE | COMMUNITY

## 2024-04-11 RX ORDER — OMEPRAZOLE 20 MG/1
1 CAPSULE 30 MINUTES BEFORE MORNING MEAL CAPSULE, DELAYED RELEASE ORAL ONCE A DAY
Qty: 30 | Refills: 3 | Status: ACTIVE | COMMUNITY

## 2024-04-11 RX ORDER — PROMETHAZINE HYDROCHLORIDE 12.5 MG/1
1 TABLET AS NEEDED TABLET ORAL
Status: ACTIVE | COMMUNITY

## 2024-04-11 RX ORDER — FLUTICASONE PROPIONATE 50 UG/1
1 SPRAY IN EACH NOSTRIL SPRAY, METERED NASAL ONCE A DAY
Status: ACTIVE | COMMUNITY

## 2024-04-11 RX ORDER — HYOSCYAMINE SULFATE 0.12 MG/1
1 TABLET AS NEEDED TABLET ORAL
Status: ACTIVE | COMMUNITY

## 2024-04-11 RX ORDER — HYOSCYAMINE SULFATE 0.12 MG/1
1 TABLET UNDER THE TONGUE AND ALLOW TO DISSOLVE AS NEEDED FOR ABDOMINAL PAIN TABLET SUBLINGUAL THREE TIMES A DAY
Qty: 90 | Refills: 3 | Status: ACTIVE | COMMUNITY
Start: 2024-02-27 | End: 2024-06-26

## 2024-04-11 RX ORDER — LATANOPROST/PF 0.005 %
1 DROP INTO AFFECTED EYE IN THE EVENING DROPS OPHTHALMIC (EYE) ONCE A DAY
Status: ACTIVE | COMMUNITY

## 2024-04-11 RX ORDER — ATORVASTATIN CALCIUM 10 MG/1
1 TABLET TABLET ORAL ONCE A DAY
Status: ACTIVE | COMMUNITY

## 2024-04-11 RX ORDER — OMEPRAZOLE 20 MG/1
1 CAPSULE 30 MINUTES BEFORE MORNING MEAL CAPSULE, DELAYED RELEASE ORAL ONCE A DAY
Qty: 90 | Refills: 3 | OUTPATIENT
Start: 2024-04-11

## 2024-04-11 NOTE — HPI-TODAY'S VISIT:
73 year old female presents for follow up. Seen previously for evaluation of lower abdominal pain. The patient had pain that began 1/21/24 at which time she called our office and Cipro and Flagyl were empirically prescribed. At the time, she was having loose stools but were non-bloody. ER visit 1/29/24 and CT with IV contrast showed diverticulosis and some fluid in the colon. Colonoscopy showed mild diverticulosis. The patient has not had pain since but today had mild LLQ discomfort. She has not had a bowel movement today. At times she has constipation more often but intermittently has loose stools.   The patient has a history of dysphagia and esophageal stricture but denies dysphagia at this time. Last EGD was in 2019 and showed a esophageal strictue s/p dilatation

## 2024-08-09 ENCOUNTER — DASHBOARD ENCOUNTERS (OUTPATIENT)
Age: 74
End: 2024-08-09

## 2024-08-12 ENCOUNTER — OFFICE VISIT (OUTPATIENT)
Dept: URBAN - METROPOLITAN AREA CLINIC 48 | Facility: CLINIC | Age: 74
End: 2024-08-12
Payer: COMMERCIAL

## 2024-08-12 VITALS
HEART RATE: 80 BPM | TEMPERATURE: 97.5 F | SYSTOLIC BLOOD PRESSURE: 117 MMHG | WEIGHT: 181.2 LBS | HEIGHT: 62 IN | BODY MASS INDEX: 33.34 KG/M2 | DIASTOLIC BLOOD PRESSURE: 79 MMHG

## 2024-08-12 DIAGNOSIS — R19.4: ICD-10-CM

## 2024-08-12 DIAGNOSIS — K21.9 GERD: ICD-10-CM

## 2024-08-12 PROBLEM — 21101000119105: Status: ACTIVE | Noted: 2024-08-12

## 2024-08-12 PROCEDURE — 99213 OFFICE O/P EST LOW 20 MIN: CPT | Performed by: NURSE PRACTITIONER

## 2024-08-12 RX ORDER — ATORVASTATIN CALCIUM 10 MG/1
1 TABLET TABLET ORAL ONCE A DAY
Qty: 90 TABLET | Refills: 0 | Status: ACTIVE | COMMUNITY

## 2024-08-12 RX ORDER — AZELASTINE HYDROCHLORIDE 137 UG/1
1 PUFF IN EACH NOSTRIL SPRAY, METERED NASAL TWICE A DAY
Status: ACTIVE | COMMUNITY

## 2024-08-12 RX ORDER — ALPRAZOLAM 0.25 MG/1
1 TABLET TABLET ORAL TWICE A DAY
Qty: 60 TABLET | Status: ACTIVE | COMMUNITY

## 2024-08-12 RX ORDER — CHLORHEXIDINE GLUCONATE 4 %
AS DIRECTED LIQUID (ML) TOPICAL
Status: ACTIVE | COMMUNITY

## 2024-08-12 RX ORDER — OMEPRAZOLE 20 MG/1
1 CAPSULE 30 MINUTES BEFORE MORNING MEAL CAPSULE, DELAYED RELEASE ORAL ONCE A DAY
Qty: 90 | Refills: 3 | OUTPATIENT

## 2024-08-12 RX ORDER — OMEPRAZOLE 20 MG/1
1 CAPSULE 30 MINUTES BEFORE MORNING MEAL CAPSULE, DELAYED RELEASE ORAL ONCE A DAY
Status: ACTIVE | COMMUNITY

## 2024-08-12 RX ORDER — FLUTICASONE PROPIONATE 50 UG/1
1 SPRAY IN EACH NOSTRIL SPRAY, METERED NASAL ONCE A DAY
Status: ACTIVE | COMMUNITY

## 2024-08-12 RX ORDER — LATANOPROST 50 UG/ML
1 DROP INTO AFFECTED EYE IN THE EVENING SOLUTION OPHTHALMIC ONCE A DAY
Qty: 4.2 ML | Refills: 1 | Status: ACTIVE | COMMUNITY

## 2024-08-12 RX ORDER — METOPROLOL SUCCINATE 25 MG/1
1 TABLET TABLET, FILM COATED, EXTENDED RELEASE ORAL ONCE A DAY
Qty: 90 TABLET | Refills: 2 | Status: ACTIVE | COMMUNITY

## 2024-08-12 RX ORDER — CHOLECALCIFEROL (VITAMIN D3) 1MM UNIT/G
AS DIRECTED LIQUID (ML) MISCELLANEOUS
Status: ACTIVE | COMMUNITY

## 2024-08-12 NOTE — HPI-TODAY'S VISIT:
74 year old female with a history of dysphagia,  presents for follow up. Seen previously for change in bowel habits and  of lower abdominal pain. The patient had pain that began 1/21/24 at which time she called our office and Cipro and Flagyl were empirically prescribed. At the time, she was having loose stools but were non-bloody. ER visit 1/29/24 and CT with IV contrast showed diverticulosis and some fluid in the colon. Colonoscopy showed mild diverticulosis. The patient has not had pain since. Currently, she is taking Benefiber daily. Occasionally she has urgent, loose stools with one episode of incontinence a week ago. Otherwise, she is not having abdominal pain and is having formed stools.  The patient has a history of dysphagia and esophageal stricture but denies dysphagia at this time. Last EGD was in 2019 and showed a esophageal strictue s/p dilatation

## 2025-01-20 ENCOUNTER — TELEPHONE ENCOUNTER (OUTPATIENT)
Dept: URBAN - METROPOLITAN AREA CLINIC 46 | Facility: CLINIC | Age: 75
End: 2025-01-20

## 2025-01-22 ENCOUNTER — LAB OUTSIDE AN ENCOUNTER (OUTPATIENT)
Dept: URBAN - METROPOLITAN AREA CLINIC 46 | Facility: CLINIC | Age: 75
End: 2025-01-22

## 2025-02-03 ENCOUNTER — TELEPHONE ENCOUNTER (OUTPATIENT)
Dept: URBAN - METROPOLITAN AREA CLINIC 46 | Facility: CLINIC | Age: 75
End: 2025-02-03

## 2025-02-10 ENCOUNTER — OFFICE VISIT (OUTPATIENT)
Dept: URBAN - METROPOLITAN AREA CLINIC 48 | Facility: CLINIC | Age: 75
End: 2025-02-10
Payer: COMMERCIAL

## 2025-02-10 VITALS
HEIGHT: 62 IN | SYSTOLIC BLOOD PRESSURE: 134 MMHG | DIASTOLIC BLOOD PRESSURE: 66 MMHG | HEART RATE: 84 BPM | WEIGHT: 187 LBS | BODY MASS INDEX: 34.41 KG/M2 | TEMPERATURE: 97.7 F

## 2025-02-10 DIAGNOSIS — R10.31 RLQ ABDOMINAL PAIN: ICD-10-CM

## 2025-02-10 PROCEDURE — 99213 OFFICE O/P EST LOW 20 MIN: CPT | Performed by: NURSE PRACTITIONER

## 2025-02-10 RX ORDER — CHLORHEXIDINE GLUCONATE 4 %
AS DIRECTED LIQUID (ML) TOPICAL
Status: ACTIVE | COMMUNITY

## 2025-02-10 RX ORDER — LATANOPROST 50 UG/ML
1 DROP INTO AFFECTED EYE IN THE EVENING SOLUTION OPHTHALMIC ONCE A DAY
Qty: 4.2 ML | Refills: 1 | Status: ACTIVE | COMMUNITY

## 2025-02-10 RX ORDER — HYOSCYAMINE SULFATE 0.12 MG/1
1 TABLET UNDER THE TONGUE AND ALLOW TO DISSOLVE AS NEEDED FOR ABDOMINAL PAIN TABLET SUBLINGUAL THREE TIMES A DAY
Qty: 90 TABLET | Refills: 1 | OUTPATIENT
Start: 2025-02-10 | End: 2025-04-11

## 2025-02-10 RX ORDER — AZELASTINE HYDROCHLORIDE 137 UG/1
1 PUFF IN EACH NOSTRIL SPRAY, METERED NASAL TWICE A DAY
Status: ACTIVE | COMMUNITY

## 2025-02-10 RX ORDER — ATORVASTATIN CALCIUM 10 MG/1
1 TABLET TABLET ORAL ONCE A DAY
Qty: 90 TABLET | Refills: 0 | Status: ACTIVE | COMMUNITY

## 2025-02-10 RX ORDER — METOPROLOL SUCCINATE 25 MG/1
1 TABLET TABLET, FILM COATED, EXTENDED RELEASE ORAL ONCE A DAY
Qty: 90 TABLET | Refills: 2 | Status: ACTIVE | COMMUNITY

## 2025-02-10 RX ORDER — CHOLECALCIFEROL (VITAMIN D3) 1MM UNIT/G
AS DIRECTED LIQUID (ML) MISCELLANEOUS
Status: ACTIVE | COMMUNITY

## 2025-02-10 RX ORDER — OMEPRAZOLE 20 MG/1
1 CAPSULE 30 MINUTES BEFORE MORNING MEAL CAPSULE, DELAYED RELEASE ORAL ONCE A DAY
Status: ACTIVE | COMMUNITY

## 2025-02-10 RX ORDER — FLUTICASONE PROPIONATE 50 UG/1
1 SPRAY IN EACH NOSTRIL SPRAY, METERED NASAL ONCE A DAY
Status: ACTIVE | COMMUNITY

## 2025-02-10 RX ORDER — ALPRAZOLAM 0.25 MG/1
1 TABLET TABLET ORAL TWICE A DAY
Qty: 60 TABLET | Status: ACTIVE | COMMUNITY

## 2025-02-10 NOTE — PHYSICAL EXAM CONSTITUTIONAL:
well developed, well nourished , in no acute distress , ambulating without difficulty using cane , normal communication ability

## 2025-02-10 NOTE — HPI-TODAY'S VISIT:
74 year old female presents for follow up or RLQ pain. Pain began in mid 1/2025. She has had ~5 weeks of hot flashes but denies chills. History of diverticulitis 2 years ago.  Treated empirically 1/20/25 for diverticulitis with Cipro and Flagyl with no improvement.  CT with IV contrast only is negative for abdominal or pelvic etiology. Getting up and down causes the most pain. Colonoscopy 2/2024 showed ascending, descending, and sigmoid diverticulosis. Hx of right hip steroid injections 1+ years ago. She has left leg pain. Has complete evacuation and daily bowel movements. She had a hysterectomy many years ago.  Seen after ER visit 1/29/24 and CT with IV contrast showed diverticulosis and some fluid in the colon.  The patient has a history of dysphagia and esophageal stricture but denies dysphagia at this time. Last EGD was in 2019 and showed a esophageal strictue s/p dilatation

## 2025-04-21 ENCOUNTER — ERX REFILL RESPONSE (OUTPATIENT)
Dept: URBAN - METROPOLITAN AREA CLINIC 44 | Facility: CLINIC | Age: 75
End: 2025-04-21

## 2025-04-21 RX ORDER — OMEPRAZOLE 20 MG/1
TAKE 1 CAPSULE BY MOUTH DAILY 30 MINUTES BEFORE BREAKFAST CAPSULE, DELAYED RELEASE ORAL
Qty: 90 CAPSULE | Refills: 0

## 2025-05-19 ENCOUNTER — OFFICE VISIT (OUTPATIENT)
Dept: URBAN - METROPOLITAN AREA CLINIC 48 | Facility: CLINIC | Age: 75
End: 2025-05-19
Payer: COMMERCIAL

## 2025-05-19 DIAGNOSIS — R19.4: ICD-10-CM

## 2025-05-19 DIAGNOSIS — K21.9 GERD: ICD-10-CM

## 2025-05-19 DIAGNOSIS — R11.0 NAUSEA: ICD-10-CM

## 2025-05-19 DIAGNOSIS — K57.90 DIVERTICULOSIS: ICD-10-CM

## 2025-05-19 PROBLEM — 422587007: Status: ACTIVE | Noted: 2025-05-19

## 2025-05-19 PROCEDURE — 99214 OFFICE O/P EST MOD 30 MIN: CPT | Performed by: INTERNAL MEDICINE

## 2025-05-19 RX ORDER — CHLORHEXIDINE GLUCONATE 4 %
AS DIRECTED LIQUID (ML) TOPICAL
Status: ACTIVE | COMMUNITY

## 2025-05-19 RX ORDER — PAROXETINE HYDROCHLORIDE HEMIHYDRATE 10 MG/1
1/2 TABLET IN THE MORNING TABLET, FILM COATED ORAL ONCE A DAY
Status: ACTIVE | COMMUNITY

## 2025-05-19 RX ORDER — CHOLECALCIFEROL (VITAMIN D3) 1MM UNIT/G
AS DIRECTED LIQUID (ML) MISCELLANEOUS
Status: ACTIVE | COMMUNITY

## 2025-05-19 RX ORDER — OMEPRAZOLE 20 MG/1
TAKE 1 CAPSULE BY MOUTH DAILY 30 MINUTES BEFORE BREAKFAST CAPSULE, DELAYED RELEASE ORAL
Qty: 90 CAPSULE | Refills: 0 | Status: ACTIVE | COMMUNITY

## 2025-05-19 RX ORDER — ALPRAZOLAM 0.25 MG/1
1 TABLET TABLET ORAL TWICE A DAY
Qty: 60 TABLET | Status: ACTIVE | COMMUNITY

## 2025-05-19 RX ORDER — LATANOPROST 50 UG/ML
1 DROP INTO AFFECTED EYE IN THE EVENING SOLUTION OPHTHALMIC ONCE A DAY
Qty: 4.2 ML | Refills: 1 | Status: ACTIVE | COMMUNITY

## 2025-05-19 RX ORDER — SUCRALFATE 1 G/1
1 TABLET ON AN EMPTY STOMACH TABLET ORAL
Qty: 42 TABLET | Refills: 0 | OUTPATIENT
Start: 2025-05-19

## 2025-05-19 RX ORDER — ATORVASTATIN CALCIUM 10 MG/1
1/2 TABLET TABLET ORAL ONCE A DAY
Refills: 0 | Status: ACTIVE | COMMUNITY

## 2025-05-19 RX ORDER — AZELASTINE HYDROCHLORIDE 137 UG/1
1 PUFF IN EACH NOSTRIL SPRAY, METERED NASAL TWICE A DAY
Status: DISCONTINUED | COMMUNITY

## 2025-05-19 RX ORDER — FLUTICASONE PROPIONATE 50 UG/1
1 SPRAY IN EACH NOSTRIL SPRAY, METERED NASAL ONCE A DAY
Status: DISCONTINUED | COMMUNITY

## 2025-05-19 RX ORDER — METOPROLOL SUCCINATE 25 MG/1
1 TABLET TABLET, FILM COATED, EXTENDED RELEASE ORAL ONCE A DAY
Qty: 90 TABLET | Refills: 2 | Status: ACTIVE | COMMUNITY

## 2025-05-19 NOTE — HPI-TODAY'S VISIT:
74 year old female presents for evaluation of nausea. She is having nausea in the evenings. Reports starting a supplement "Sherri slim", for weight loss. Currenlty she is still on Omeprazole with good control of reflux. Denies new medications or antibiotics. Takes NSAIDs rarely.   Seen previously for RLQ pain. Pain has since resolved. History of diverticulitis 2 years ago.  Treated empirically 1/20/25 for diverticulitis with Cipro and Flagyl with no improvement.  CT with IV contrast only is negative for abdominal or pelvic etiology. Getting up and down causes the most pain. Colonoscopy 2/2024 showed ascending, descending, and sigmoid diverticulosis.  Has complete evacuation and daily bowel movements. She had a hysterectomy many years ago.  The patient has a history of dysphagia and esophageal stricture but denies dysphagia at this time. Last EGD was in 2019 and showed a esophageal strictue s/p dilatation

## 2025-07-14 ENCOUNTER — TELEPHONE ENCOUNTER (OUTPATIENT)
Dept: URBAN - METROPOLITAN AREA CLINIC 48 | Facility: CLINIC | Age: 75
End: 2025-07-14

## 2025-07-14 RX ORDER — OMEPRAZOLE 20 MG/1
1 CAPSULE 1/2 TO 1 HOUR BEFORE MORNING MEAL CAPSULE, DELAYED RELEASE ORAL ONCE A DAY
Qty: 90 | Refills: 1